# Patient Record
Sex: MALE
[De-identification: names, ages, dates, MRNs, and addresses within clinical notes are randomized per-mention and may not be internally consistent; named-entity substitution may affect disease eponyms.]

---

## 2020-10-29 ENCOUNTER — HOSPITAL ENCOUNTER (OUTPATIENT)
Dept: HOSPITAL 92 - SCSCT | Age: 50
Discharge: HOME | End: 2020-10-29
Attending: SPECIALIST
Payer: OTHER GOVERNMENT

## 2020-10-29 DIAGNOSIS — N43.3: ICD-10-CM

## 2020-10-29 DIAGNOSIS — R10.13: Primary | ICD-10-CM

## 2020-10-29 DIAGNOSIS — K57.30: ICD-10-CM

## 2020-10-29 PROCEDURE — 74177 CT ABD & PELVIS W/CONTRAST: CPT

## 2020-10-29 NOTE — CT
CT OF THE ABDOMEN AND PELVIS WITH IV CONTRAST



INDICATION: Epigastric abdominal pain



COMPARISON: None



FINDINGS:





ABDOMEN:



Lung bases: There is a 2 cm right infrahilar lymph node. Lung bases are clear.



Liver: There is mild fatty infiltration of the liver near the falciform ligament.



Gallbladder:  Normal appearing.



Pancreas: Normal.



Adrenal glands: Normal.



Spleen: Normal.



Kidneys and ureters: Normal.  No hydronephrosis.



Vasculature: There are mild vascular calcifications seen involving the visualized vasculature.



Lymph nodes:No lymphadenopathy.



Free fluid in abdomen:No free fluid is evident.





PELVIS:



Small and large bowel: There are scattered diverticula involving the colon without evidence of active
 diverticulitis. Small bowel is of normal caliber.



Appendix:Normal



Bladder: Normal.



Rectal and perirectal soft tissues:Normal.



Reproductive structures: Normal.



Free fluid in pelvis: No free fluid is evident.



Lymphadenopathy pelvis: No lymphadenopathy is evident.



Osseous structures: No acute osseous abnormality.  No destructive osteolytic or osteoblastic lesion i
s identified.  There is scattered degenerative and osteoarthritic changes.



Soft tissues:Incidental note is made of bilateral hydroceles within the visualized upper scrotum. The
re is a small fat-containing umbilicus hernia.



IMPRESSION:

1. No CT explanation for the patient's epigastric abdominal pain.

2. Incidental note of a 2 cm right infrahilar lymph node. Follow-up CT of the thorax utilizing IV con
trast is recommended for evaluation for additional stations of lymphadenopathy and further

characterization.

3. Colonic diverticulosis without evidence of active diverticulitis.

4. Bilateral scrotal hydroceles



Reported By: Basilio Sanchez 

Electronically Signed:  10/29/2020 11:27 AM

## 2021-05-05 ENCOUNTER — HOSPITAL ENCOUNTER (OUTPATIENT)
Dept: HOSPITAL 92 - SCSRAD | Age: 51
Discharge: HOME | End: 2021-05-05
Attending: PODIATRIST
Payer: OTHER GOVERNMENT

## 2021-05-05 DIAGNOSIS — M79.671: Primary | ICD-10-CM

## 2021-05-05 DIAGNOSIS — M18.0: ICD-10-CM

## 2021-05-05 DIAGNOSIS — M79.672: ICD-10-CM

## 2021-05-05 DIAGNOSIS — M77.31: ICD-10-CM

## 2021-05-05 DIAGNOSIS — M21.42: ICD-10-CM
